# Patient Record
Sex: FEMALE | Race: WHITE
[De-identification: names, ages, dates, MRNs, and addresses within clinical notes are randomized per-mention and may not be internally consistent; named-entity substitution may affect disease eponyms.]

---

## 2021-04-13 ENCOUNTER — HOSPITAL ENCOUNTER (EMERGENCY)
Dept: HOSPITAL 47 - EC | Age: 21
Discharge: HOME | End: 2021-04-13
Payer: OTHER GOVERNMENT

## 2021-04-13 VITALS
TEMPERATURE: 97.7 F | HEART RATE: 73 BPM | SYSTOLIC BLOOD PRESSURE: 104 MMHG | DIASTOLIC BLOOD PRESSURE: 71 MMHG | RESPIRATION RATE: 20 BRPM

## 2021-04-13 DIAGNOSIS — Z90.89: ICD-10-CM

## 2021-04-13 DIAGNOSIS — F17.200: ICD-10-CM

## 2021-04-13 DIAGNOSIS — R11.2: Primary | ICD-10-CM

## 2021-04-13 DIAGNOSIS — J02.9: ICD-10-CM

## 2021-04-13 DIAGNOSIS — Z88.0: ICD-10-CM

## 2021-04-13 DIAGNOSIS — Z98.890: ICD-10-CM

## 2021-04-13 PROCEDURE — 96372 THER/PROPH/DIAG INJ SC/IM: CPT

## 2021-04-13 PROCEDURE — 99283 EMERGENCY DEPT VISIT LOW MDM: CPT

## 2021-04-13 NOTE — ED
General Adult HPI





- General


Chief complaint: Abdominal Pain


Stated complaint: Post op vomiting


Time Seen by Provider: 04/13/21 23:01


Source: patient


Mode of arrival: ambulatory


Limitations: no limitations





- History of Present Illness


Initial comments: 





Patient is a 20-year-old female presenting to the emergency Department with 

complaints of vomiting today as well as a sore throat.  Patient is s/p 

tonsillectomy 1 day, performed at [facility].  Patient states she took 

her pain medicine yesterday and then slept for most the day, she took it again 

today and then a few hours later had a vomiting episode.  She states she does 

not currently feel nauseous.  She denies any fevers or chills.  She states she 

is having a sore throat and is having soreness with swallowing.  She denies any 

abdominal pain, no diarrhea.  She has no further complaints at this time.  Upon 

arrival to the ER vitals are stable.





- Related Data


                                    Allergies











Allergy/AdvReac Type Severity Reaction Status Date / Time


 


Penicillins Allergy  Swelling Verified 04/13/21 22:47














Review of Systems


ROS Statement: 


Those systems with pertinent positive or pertinent negative responses have been 

documented in the HPI.





ROS Other: All systems not noted in ROS Statement are negative.





Past Medical History


Past Medical History: No Reported History


History of Any Multi-Drug Resistant Organisms: None Reported


Past Surgical History: Tonsillectomy


Past Psychological History: No Psychological Hx Reported


Smoking Status: Current every day smoker


Past Alcohol Use History: None Reported


Past Drug Use History: None Reported





General Exam





- General Exam Comments


Initial Comments: 





GENERAL: 


Patient is well-developed and well-nourished.  Patient is nontoxic and in no 

acute distress.





HEAD: 


Atraumatic, normocephalic.





EYES:


Pupils equal round and reactive to light, extraocular movements intact, sclera 

anicteric, conjunctiva are normal.  Eyelids were unremarkable.





ENT: 


TMs normal, nares patent, oropharynx with recent surgical intervention, erythema

present, no active drainage.  Moist mucous membranes.





NECK: 


Normal range of motion, supple without lymphadenopathy or JVD.





LUNGS:


Unlabored respirations.  Breath sounds clear to auscultation bilaterally and 

equal.  No wheezes rales or rhonchi.





HEART:


Regular rate and rhythm without murmurs, rubs or gallops.





ABDOMEN: 


Soft, nontender, normoactive bowel sounds.  No guarding, no rebound.  No masses 

appreciated.





: Deferred 





MUSCULOSKELETAL: 


Normal extremities with adequate strength and normal range of motion, no pitting

or edema.  No clubbing or cyanosis.





NEUROLOGICAL: 


Patient is alert and oriented x 3.   Normal speech, normal gait.   





PSYCH:


Normal mood, normal affect.





SKIN:


 Warm, Dry, normal turgor, no rashes or lesions noted.


Limitations: no limitations





Course


                                   Vital Signs











  04/13/21





  22:44


 


Temperature 97.7 F


 


Pulse Rate 73


 


Respiratory 20





Rate 


 


Blood Pressure 104/71


 


O2 Sat by Pulse 99





Oximetry 














Medical Decision Making





- Medical Decision Making





Patient is 20-year-old female here s/p tonsillectomy one day, performed at [facility].  She presents after a vomiting episode after taking her pain 

medication.  Her vitals are stable, her exam is consistent with a recent 

tonsillectomy, no other abnormalities.  Discussed with patient that she most 

likely had vomiting episodes secondary to her pain medicine.  I will give her a 

few tablets of Zofran to go home with, Toradol shot for her pain.  She is stable

for discharge.  She can follow-up with her surgeon.  She is in agreement with 

this plan of care, return parameters were discussed with the patient she 

verbalized understanding.  Case discussed with Dr. Gordon. 





Disposition


Clinical Impression: 


 Status post tonsillectomy, Nausea and vomiting





Disposition: HOME SELF-CARE


Condition: Stable


Instructions (If sedation given, give patient instructions):  Acute Nausea and 

Vomiting (ED)


Additional Instructions: 


Please return to the Emergency Department if symptoms worsen or any other 

concerns.


Continue to increase your fluids as discussed.


May take Zofran for any additional nausea.


Follow-up with your surgeon.


Is patient prescribed a controlled substance at d/c from ED?: No


Referrals: 


Nonstaff,Physician [Primary Care Provider] - 1-2 days

## 2021-09-12 ENCOUNTER — HOSPITAL ENCOUNTER (EMERGENCY)
Dept: HOSPITAL 47 - EC | Age: 21
Discharge: HOME | End: 2021-09-12
Payer: COMMERCIAL

## 2021-09-12 VITALS — DIASTOLIC BLOOD PRESSURE: 72 MMHG | HEART RATE: 71 BPM | RESPIRATION RATE: 18 BRPM | SYSTOLIC BLOOD PRESSURE: 113 MMHG

## 2021-09-12 VITALS — TEMPERATURE: 98.7 F

## 2021-09-12 DIAGNOSIS — F32.9: Primary | ICD-10-CM

## 2021-09-12 DIAGNOSIS — Z88.0: ICD-10-CM

## 2021-09-12 DIAGNOSIS — F17.200: ICD-10-CM

## 2021-09-12 LAB
ALBUMIN SERPL-MCNC: 4.3 G/DL (ref 3.5–5)
ALP SERPL-CCNC: 59 U/L (ref 38–126)
ALT SERPL-CCNC: 25 U/L (ref 4–34)
ANION GAP SERPL CALC-SCNC: 9 MMOL/L
APAP SPEC-MCNC: <10 UG/ML
AST SERPL-CCNC: 31 U/L (ref 14–36)
BASOPHILS # BLD AUTO: 0 K/UL (ref 0–0.2)
BASOPHILS NFR BLD AUTO: 0 %
BUN SERPL-SCNC: 13 MG/DL (ref 7–17)
CALCIUM SPEC-MCNC: 9.7 MG/DL (ref 8.4–10.2)
CHLORIDE SERPL-SCNC: 109 MMOL/L (ref 98–107)
CK SERPL-CCNC: 72 U/L (ref 30–135)
CO2 SERPL-SCNC: 20 MMOL/L (ref 22–30)
EOSINOPHIL # BLD AUTO: 0.2 K/UL (ref 0–0.7)
EOSINOPHIL NFR BLD AUTO: 4 %
ERYTHROCYTE [DISTWIDTH] IN BLOOD BY AUTOMATED COUNT: 4.88 M/UL (ref 3.8–5.4)
ERYTHROCYTE [DISTWIDTH] IN BLOOD: 12.7 % (ref 11.5–15.5)
GLUCOSE SERPL-MCNC: 79 MG/DL (ref 74–99)
HCG SERPL-MCNC: <2.4 MIU/ML
HCT VFR BLD AUTO: 42.5 % (ref 34–46)
HGB BLD-MCNC: 14.3 GM/DL (ref 11.4–16)
INR PPP: 1.1 (ref ?–1.2)
LYMPHOCYTES # SPEC AUTO: 1.9 K/UL (ref 1–4.8)
LYMPHOCYTES NFR SPEC AUTO: 36 %
MCH RBC QN AUTO: 29.4 PG (ref 25–35)
MCHC RBC AUTO-ENTMCNC: 33.7 G/DL (ref 31–37)
MCV RBC AUTO: 87.1 FL (ref 80–100)
MONOCYTES # BLD AUTO: 0.4 K/UL (ref 0–1)
MONOCYTES NFR BLD AUTO: 8 %
NEUTROPHILS # BLD AUTO: 2.6 K/UL (ref 1.3–7.7)
NEUTROPHILS NFR BLD AUTO: 49 %
PH UR: 6.5 [PH] (ref 5–8)
PLATELET # BLD AUTO: 266 K/UL (ref 150–450)
POTASSIUM SERPL-SCNC: 4.4 MMOL/L (ref 3.5–5.1)
PROT SERPL-MCNC: 7 G/DL (ref 6.3–8.2)
PT BLD: 11.7 SEC (ref 9–12)
RBC UR QL: 1 /HPF (ref 0–5)
SALICYLATES SERPL-MCNC: <1 MG/DL
SODIUM SERPL-SCNC: 138 MMOL/L (ref 137–145)
SP GR UR: 1.02 (ref 1–1.03)
SQUAMOUS UR QL AUTO: 1 /HPF (ref 0–4)
UROBILINOGEN UR QL STRIP: <2 MG/DL (ref ?–2)
WBC # BLD AUTO: 5.3 K/UL (ref 4–11)
WBC # UR AUTO: 2 /HPF (ref 0–5)

## 2021-09-12 PROCEDURE — 99285 EMERGENCY DEPT VISIT HI MDM: CPT

## 2021-09-12 PROCEDURE — 70450 CT HEAD/BRAIN W/O DYE: CPT

## 2021-09-12 PROCEDURE — 80320 DRUG SCREEN QUANTALCOHOLS: CPT

## 2021-09-12 PROCEDURE — 80053 COMPREHEN METABOLIC PANEL: CPT

## 2021-09-12 PROCEDURE — 71045 X-RAY EXAM CHEST 1 VIEW: CPT

## 2021-09-12 PROCEDURE — 85610 PROTHROMBIN TIME: CPT

## 2021-09-12 PROCEDURE — 82550 ASSAY OF CK (CPK): CPT

## 2021-09-12 PROCEDURE — 93005 ELECTROCARDIOGRAM TRACING: CPT

## 2021-09-12 PROCEDURE — 81001 URINALYSIS AUTO W/SCOPE: CPT

## 2021-09-12 PROCEDURE — 36415 COLL VENOUS BLD VENIPUNCTURE: CPT

## 2021-09-12 PROCEDURE — 80179 DRUG ASSAY SALICYLATE: CPT

## 2021-09-12 PROCEDURE — 80306 DRUG TEST PRSMV INSTRMNT: CPT

## 2021-09-12 PROCEDURE — 80143 DRUG ASSAY ACETAMINOPHEN: CPT

## 2021-09-12 PROCEDURE — 85025 COMPLETE CBC W/AUTO DIFF WBC: CPT

## 2021-09-12 PROCEDURE — 84702 CHORIONIC GONADOTROPIN TEST: CPT

## 2021-09-12 PROCEDURE — 87635 SARS-COV-2 COVID-19 AMP PRB: CPT

## 2021-09-12 NOTE — ED
General Adult HPI





- General


Source: EMS (And patient's roommate)


Mode of arrival: EMS


Limitations: no limitations





<Tong Lynn - Last Filed: 09/12/21 14:38>





<Albert Munoz - Last Filed: 09/12/21 16:07>





- General


Chief complaint: Altered Mental Status


Stated complaint: Altered Mental Status


Time Seen by Provider: 09/12/21 12:17





- History of Present Illness


Initial comments: 





Patient is a pleasant 21-year-old female presenting to the emergency department 

with change in mental status.  Patient's roommate was unable to wake her up 

today.  They did find a bottle of Lexapro with approximately 20 missing.  

Patient is not verbal at this time and provides no additional history.  Roommate

states patient has been depressed recently.  No reported trauma. (Tong Lynn)





- Related Data


                                Home Medications











 Medication  Instructions  Recorded  Confirmed


 


Escitalopram [Lexapro] 10 mg PO DAILY 09/12/21 09/12/21


 


Levonor 0.15mg-Ee 20-25-30 Mcg 1 tab PO DAILY 09/12/21 09/12/21











                                    Allergies











Allergy/AdvReac Type Severity Reaction Status Date / Time


 


Penicillins Allergy  Swelling Verified 04/13/21 22:47














Review of Systems


ROS Other: All systems not noted in ROS Statement are negative.


Limitations: ROS unobtainable due to patients medical condition





<Tong Lynn - Last Filed: 09/12/21 14:38>


ROS Other: All systems not noted in ROS Statement are negative.





<Albert Munoz - Last Filed: 09/12/21 16:07>


ROS Statement: 


Those systems with pertinent positive or pertinent negative responses have been 

documented in the HPI.








Past Medical History


Past Medical History: No Reported History


History of Any Multi-Drug Resistant Organisms: None Reported


Past Surgical History: Tonsillectomy


Past Psychological History: No Psychological Hx Reported


Smoking Status: Current every day smoker


Past Alcohol Use History: None Reported


Past Drug Use History: None Reported





<Tong Lynn - Last Filed: 09/12/21 14:38>





General Exam


Limitations: no limitations


General appearance: alert


Head exam: Present: atraumatic, normocephalic


Eye exam: Present: normal appearance, PERRL


Neck exam: Present: normal inspection.  Absent: tenderness


Respiratory exam: Present: normal lung sounds bilaterally


Cardiovascular Exam: Present: regular rate, normal rhythm


GI/Abdominal exam: Present: soft.  Absent: tenderness


Extremities exam: Present: normal inspection


Neurological exam: Present: alert, other (Nonverbal.  Not following commands.). 

Absent: motor sensory deficit (Limited exam.  No flaccid weakness.)


Psychiatric exam: Present: flat affect


Skin exam: Present: normal color





<Tong Lynn - Last Filed: 09/12/21 14:38>





Course





<Albert Munoz - Last Filed: 09/12/21 16:07>





                                   Vital Signs











  09/12/21





  12:12


 


Temperature 98.7 F


 


Pulse Rate 79


 


Respiratory 18





Rate 


 


Blood Pressure 127/77


 


O2 Sat by Pulse 100





Oximetry 














- Reevaluation(s)


Reevaluation #1: 





09/12/21 16:05


Patient was endorsed to me by ED physician Dr. Lynn (secondary to shift change)

with the EPS nurse's evaluation still pending.  Dr. Lynn has medically cleared 

the patient.  EPS nurse states that they feel that the patient can be safely 

discharged home at this time with action plan and outpatient psychiatric 

referral.  Will discharge patient home at this time. (Albert Munoz)





EKG Findings





- EKG Comments:


EKG Findings:: No sinus rhythm with a rate of 71.  .  QRS 80.  QT 32.  QTC

4:15.  Normal axis.  Normal QRS.  No acute ST change.





<Tong Lynn - Last Filed: 09/12/21 14:38>





Medical Decision Making





- Lab Data


Result diagrams: 


                                 09/12/21 12:38





                                 09/12/21 12:38





- Radiology Data


Radiology results: report reviewed (Computed tomography scan of the brain 

reveals no acute process), image reviewed (Chest x-ray shows no acute process)





<Tong Lynn - Last Filed: 09/12/21 14:38>





- Lab Data


Result diagrams: 


                                 09/12/21 12:38





                                 09/12/21 12:38





<Albert Munoz - Last Filed: 09/12/21 16:07>





- Medical Decision Making





Patient reevaluated and is alert and admits to feeling depressed.  She states 

earlier she did not want to wake up.  Patient denies any ingestion.  Patient 

cleared for mental health evaluation. (Tong Lynn)





- Lab Data





                                   Lab Results











  09/12/21 09/12/21 09/12/21 Range/Units





  12:38 12:38 12:38 


 


WBC  5.3    (4.0-11.0)  k/uL


 


RBC  4.88    (3.80-5.40)  m/uL


 


Hgb  14.3    (11.4-16.0)  gm/dL


 


Hct  42.5    (34.0-46.0)  %


 


MCV  87.1    (80.0-100.0)  fL


 


MCH  29.4    (25.0-35.0)  pg


 


MCHC  33.7    (31.0-37.0)  g/dL


 


RDW  12.7    (11.5-15.5)  %


 


Plt Count  266    (150-450)  k/uL


 


MPV  8.0    


 


Neutrophils %  49    %


 


Lymphocytes %  36    %


 


Monocytes %  8    %


 


Eosinophils %  4    %


 


Basophils %  0    %


 


Neutrophils #  2.6    (1.3-7.7)  k/uL


 


Lymphocytes #  1.9    (1.0-4.8)  k/uL


 


Monocytes #  0.4    (0-1.0)  k/uL


 


Eosinophils #  0.2    (0-0.7)  k/uL


 


Basophils #  0.0    (0-0.2)  k/uL


 


PT   11.7   (9.0-12.0)  sec


 


INR   1.1   (<1.2)  


 


Sodium     (137-145)  mmol/L


 


Potassium     (3.5-5.1)  mmol/L


 


Chloride     ()  mmol/L


 


Carbon Dioxide     (22-30)  mmol/L


 


Anion Gap     mmol/L


 


BUN     (7-17)  mg/dL


 


Creatinine     (0.52-1.04)  mg/dL


 


Est GFR (CKD-EPI)AfAm     (>60 ml/min/1.73 sqM)  


 


Est GFR (CKD-EPI)NonAf     (>60 ml/min/1.73 sqM)  


 


Glucose     (74-99)  mg/dL


 


Calcium     (8.4-10.2)  mg/dL


 


Total Bilirubin     (0.2-1.3)  mg/dL


 


AST     (14-36)  U/L


 


ALT     (4-34)  U/L


 


Alkaline Phosphatase     ()  U/L


 


Creatine Kinase     ()  U/L


 


Total Protein     (6.3-8.2)  g/dL


 


Albumin     (3.5-5.0)  g/dL


 


HCG, Quant     mIU/mL


 


Urine Color    Yellow  


 


Urine Appearance    Clear  (Clear)  


 


Urine pH    6.5  (5.0-8.0)  


 


Ur Specific Gravity    1.019  (1.001-1.035)  


 


Urine Protein    Negative  (Negative)  


 


Urine Glucose (UA)    Negative  (Negative)  


 


Urine Ketones    Negative  (Negative)  


 


Urine Blood    Negative  (Negative)  


 


Urine Nitrite    Negative  (Negative)  


 


Urine Bilirubin    Negative  (Negative)  


 


Urine Urobilinogen    <2.0  (<2.0)  mg/dL


 


Ur Leukocyte Esterase    Small H  (Negative)  


 


Urine RBC    1  (0-5)  /hpf


 


Urine WBC    2  (0-5)  /hpf


 


Ur Squamous Epith Cells    1  (0-4)  /hpf


 


Urine Mucus    Rare H  (None)  /hpf


 


Salicylates     mg/dL


 


Urine Opiates Screen    Not Detected  (NotDetected)  


 


Ur Oxycodone Screen    Not Detected  (NotDetected)  


 


Urine Methadone Screen    Not Detected  (NotDetected)  


 


Ur Propoxyphene Screen    Not Detected  (NotDetected)  


 


Acetaminophen     ug/mL


 


Ur Barbiturates Screen    Not Detected  (NotDetected)  


 


U Tricyclic Antidepress    Not Detected  (NotDetected)  


 


Ur Phencyclidine Scrn    Not Detected  (NotDetected)  


 


Ur Amphetamines Screen    Not Detected  (NotDetected)  


 


U Methamphetamines Scrn    Not Detected  (NotDetected)  


 


U Benzodiazepines Scrn    Not Detected  (NotDetected)  


 


Urine Cocaine Screen    Not Detected  (NotDetected)  


 


U Marijuana (THC) Screen    Not Detected  (NotDetected)  


 


Serum Alcohol     mg/dL


 


Coronavirus (PCR)     (Not Detectd)  














  09/12/21 09/12/21 Range/Units





  12:38 12:53 


 


WBC    (4.0-11.0)  k/uL


 


RBC    (3.80-5.40)  m/uL


 


Hgb    (11.4-16.0)  gm/dL


 


Hct    (34.0-46.0)  %


 


MCV    (80.0-100.0)  fL


 


MCH    (25.0-35.0)  pg


 


MCHC    (31.0-37.0)  g/dL


 


RDW    (11.5-15.5)  %


 


Plt Count    (150-450)  k/uL


 


MPV    


 


Neutrophils %    %


 


Lymphocytes %    %


 


Monocytes %    %


 


Eosinophils %    %


 


Basophils %    %


 


Neutrophils #    (1.3-7.7)  k/uL


 


Lymphocytes #    (1.0-4.8)  k/uL


 


Monocytes #    (0-1.0)  k/uL


 


Eosinophils #    (0-0.7)  k/uL


 


Basophils #    (0-0.2)  k/uL


 


PT    (9.0-12.0)  sec


 


INR    (<1.2)  


 


Sodium  138   (137-145)  mmol/L


 


Potassium  4.4   (3.5-5.1)  mmol/L


 


Chloride  109 H   ()  mmol/L


 


Carbon Dioxide  20 L   (22-30)  mmol/L


 


Anion Gap  9   mmol/L


 


BUN  13   (7-17)  mg/dL


 


Creatinine  0.67   (0.52-1.04)  mg/dL


 


Est GFR (CKD-EPI)AfAm  >90   (>60 ml/min/1.73 sqM)  


 


Est GFR (CKD-EPI)NonAf  >90   (>60 ml/min/1.73 sqM)  


 


Glucose  79   (74-99)  mg/dL


 


Calcium  9.7   (8.4-10.2)  mg/dL


 


Total Bilirubin  0.6   (0.2-1.3)  mg/dL


 


AST  31   (14-36)  U/L


 


ALT  25   (4-34)  U/L


 


Alkaline Phosphatase  59   ()  U/L


 


Creatine Kinase  72   ()  U/L


 


Total Protein  7.0   (6.3-8.2)  g/dL


 


Albumin  4.3   (3.5-5.0)  g/dL


 


HCG, Quant  <2.4   mIU/mL


 


Urine Color    


 


Urine Appearance    (Clear)  


 


Urine pH    (5.0-8.0)  


 


Ur Specific Gravity    (1.001-1.035)  


 


Urine Protein    (Negative)  


 


Urine Glucose (UA)    (Negative)  


 


Urine Ketones    (Negative)  


 


Urine Blood    (Negative)  


 


Urine Nitrite    (Negative)  


 


Urine Bilirubin    (Negative)  


 


Urine Urobilinogen    (<2.0)  mg/dL


 


Ur Leukocyte Esterase    (Negative)  


 


Urine RBC    (0-5)  /hpf


 


Urine WBC    (0-5)  /hpf


 


Ur Squamous Epith Cells    (0-4)  /hpf


 


Urine Mucus    (None)  /hpf


 


Salicylates  <1.0   mg/dL


 


Urine Opiates Screen    (NotDetected)  


 


Ur Oxycodone Screen    (NotDetected)  


 


Urine Methadone Screen    (NotDetected)  


 


Ur Propoxyphene Screen    (NotDetected)  


 


Acetaminophen  <10.0   ug/mL


 


Ur Barbiturates Screen    (NotDetected)  


 


U Tricyclic Antidepress    (NotDetected)  


 


Ur Phencyclidine Scrn    (NotDetected)  


 


Ur Amphetamines Screen    (NotDetected)  


 


U Methamphetamines Scrn    (NotDetected)  


 


U Benzodiazepines Scrn    (NotDetected)  


 


Urine Cocaine Screen    (NotDetected)  


 


U Marijuana (THC) Screen    (NotDetected)  


 


Serum Alcohol  <10   mg/dL


 


Coronavirus (PCR)   Not Detected  (Not Detectd)  














Disposition


Is patient prescribed a controlled substance at d/c from ED?: No





<Tong Lynn - Last Filed: 09/12/21 14:38>


Is patient prescribed a controlled substance at d/c from ED?: No


Time of Disposition: 16:07





<Albert Munoz - Last Filed: 09/12/21 16:07>


Clinical Impression: 


 Depression





Disposition: HOME SELF-CARE


Condition: Stable


Instructions (If sedation given, give patient instructions):  Depression (DC)


Additional Instructions: 


Return to the ER immediately should you develop thoughts of hurting herself or 

others, any significant pain, shortness of breath, feeling dizzy or faint, or 

new or worsening symptoms.  Follow up closely with your primary care provider, 

as well as psychiatry.


Referrals: 


Abram Mcnair MD [STAFF PHYSICIAN] - 1-2 days

## 2021-09-12 NOTE — CT
EXAMINATION TYPE: CT brain wo con

 

DATE OF EXAM: 9/12/2021

 

COMPARISON: None available

 

HISTORY: AMS

 

CT DLP: 1094.4 mGycm.  Automated Exposure Control for Dose Reduction was Utilized.

 

TECHNIQUE: Multiple contiguous axial CT images of the head were performed from the skull base through
 the vertex without the administration of intravenous contrast. 2-D sagittal and coronal reformats we
re obtained.

 

FINDINGS:   No acute intracranial hemorrhage, mass effect, or midline shift. The ventricles and sulci
 are within normal limits in size. Gray-white differentiation is preserved. No CT evidence of acute l
arge vessel territorial ischemia. Calvarium appears intact. The globes are intact and the visualized 
sinuses are clear. Mildly prominent adenoid tissue.

 

IMPRESSION:  Negative contrast enhanced head CT exam.

## 2021-09-12 NOTE — XR
EXAMINATION TYPE: XR chest 1V portable

 

DATE OF EXAM: 9/12/2021

 

COMPARISON: NONE

 

HISTORY: Acute mental status change.

 

TECHNIQUE: Single frontal view of the chest is obtained.

 

FINDINGS:  There is no focal air space opacity, pleural effusion, or pneumothorax seen.  The cardiac 
silhouette size is within normal limits.   The osseous structures are intact.

 

IMPRESSION:  No acute process.

## 2021-11-15 ENCOUNTER — HOSPITAL ENCOUNTER (OUTPATIENT)
Dept: HOSPITAL 47 - RADUSWWP | Age: 21
Discharge: HOME | End: 2021-11-15
Attending: OBSTETRICS & GYNECOLOGY
Payer: COMMERCIAL

## 2021-11-15 DIAGNOSIS — Z3A.01: ICD-10-CM

## 2021-11-15 DIAGNOSIS — Z36.87: Primary | ICD-10-CM

## 2021-11-15 PROCEDURE — 76801 OB US < 14 WKS SINGLE FETUS: CPT

## 2021-11-15 NOTE — US
Continued Stay Note  [facility]     Patient Name: Silvia Zabala  MRN: [id number]  Today's Date: 2/20/2017    Admit Date: 2/15/2017          Discharge Plan       02/20/17 0727    Case Management/Social Work Plan    Plan Return home    Patient/Family In Agreement With Plan yes    Final Note    Final Note Patient was DC'd home 2/19/17              Discharge Codes       02/20/17 0727    Discharge Codes    Discharge Codes 01  Discharge to home        Expected Discharge Date and Time     Expected Discharge Date Expected Discharge Time    Feb 19, 2017             Becky S. [place], RN     EXAMINATION TYPE: Ultrasound OB <= 14 week fetus

 

DATE OF EXAM: 11/15/2021 2:40 PM

 

COMPARISON: NONE

 

CLINICAL HISTORY: 21-year-old female Z36 Confirm dates.

 

EXAM PERFORMED:  Transabdominal (TA)

 

FINDINGS:

 

EXAM MEASUREMENTS:

 

GESTATIONAL AGE / DATING

 

Physician Established: Not yet established 

Dates by LMP: (7 weeks/6 days) 

** EDC:  6-28-21

Dates by First Scan:  No previous this is first scan 

Dates by Current Scan for: (7 weeks/4 days)  

** EDC: 6-30-21

 

MATERNAL ANATOMY 

 

Uterus: 9.2 x 4.7 x 6.6cm

Right Ovary: 2.1 x 1.9 x 1.6cm

Left Ovary: 2.9 x 1.9 x 1.6cm

Post CDS / Adnexa: wnl

Presence of free fluid: no

 

GESTATION / FETAL SURVEY

 

CRL: 1.4cm (7 weeks/4 days)

Yolk Sac (normal less than 6mm): 3mm

Heart Rate: 167 bpm

Rhythm:  Normal

IUP:  Viable IUP

 

Age Appropriate Anatomy

Cord Insertion: Too early to visualize

Fetal Limbs: Too early to visualize

Calvarium: Too early to visualize

 

Date of LMP: 9-21-21

Beta HcG (if available): Not available at this time

 

 

 

 

 

IMPRESSION:

 

1. Single live intrauterine pregnancy with estimated gestational age of 7 weeks 6 days by LMP. Curren
t ultrasound biometry is concordant (7 weeks 4 days) by CRL.

2. Complete fetal survey recommended at 18-20 weeks.

## 2021-11-17 ENCOUNTER — HOSPITAL ENCOUNTER (EMERGENCY)
Dept: HOSPITAL 47 - EC | Age: 21
Discharge: HOME | End: 2021-11-17
Payer: COMMERCIAL

## 2021-11-17 VITALS — HEART RATE: 66 BPM | SYSTOLIC BLOOD PRESSURE: 104 MMHG | DIASTOLIC BLOOD PRESSURE: 69 MMHG

## 2021-11-17 VITALS — TEMPERATURE: 98.2 F | RESPIRATION RATE: 18 BRPM

## 2021-11-17 DIAGNOSIS — F17.200: ICD-10-CM

## 2021-11-17 DIAGNOSIS — Z3A.08: ICD-10-CM

## 2021-11-17 DIAGNOSIS — Z90.89: ICD-10-CM

## 2021-11-17 DIAGNOSIS — O20.0: Primary | ICD-10-CM

## 2021-11-17 DIAGNOSIS — O99.331: ICD-10-CM

## 2021-11-17 DIAGNOSIS — Z88.0: ICD-10-CM

## 2021-11-17 LAB
ALBUMIN SERPL-MCNC: 3.8 G/DL (ref 3.5–5)
ALP SERPL-CCNC: 50 U/L (ref 38–126)
ALT SERPL-CCNC: 12 U/L (ref 4–34)
ANION GAP SERPL CALC-SCNC: 7 MMOL/L
AST SERPL-CCNC: 22 U/L (ref 14–36)
BASOPHILS # BLD AUTO: 0 K/UL (ref 0–0.2)
BASOPHILS NFR BLD AUTO: 0 %
BUN SERPL-SCNC: 7 MG/DL (ref 7–17)
CALCIUM SPEC-MCNC: 9.4 MG/DL (ref 8.4–10.2)
CHLORIDE SERPL-SCNC: 107 MMOL/L (ref 98–107)
CO2 SERPL-SCNC: 21 MMOL/L (ref 22–30)
EOSINOPHIL # BLD AUTO: 0.1 K/UL (ref 0–0.7)
EOSINOPHIL NFR BLD AUTO: 2 %
ERYTHROCYTE [DISTWIDTH] IN BLOOD BY AUTOMATED COUNT: 4.41 M/UL (ref 3.8–5.4)
ERYTHROCYTE [DISTWIDTH] IN BLOOD: 13.2 % (ref 11.5–15.5)
GLUCOSE SERPL-MCNC: 78 MG/DL (ref 74–99)
HCT VFR BLD AUTO: 37.6 % (ref 34–46)
HGB BLD-MCNC: 12.8 GM/DL (ref 11.4–16)
LYMPHOCYTES # SPEC AUTO: 1.4 K/UL (ref 1–4.8)
LYMPHOCYTES NFR SPEC AUTO: 18 %
MCH RBC QN AUTO: 29 PG (ref 25–35)
MCHC RBC AUTO-ENTMCNC: 34 G/DL (ref 31–37)
MCV RBC AUTO: 85.2 FL (ref 80–100)
MONOCYTES # BLD AUTO: 0.3 K/UL (ref 0–1)
MONOCYTES NFR BLD AUTO: 4 %
NEUTROPHILS # BLD AUTO: 5.9 K/UL (ref 1.3–7.7)
NEUTROPHILS NFR BLD AUTO: 75 %
PH UR: 7.5 [PH] (ref 5–8)
PLATELET # BLD AUTO: 252 K/UL (ref 150–450)
POTASSIUM SERPL-SCNC: 4 MMOL/L (ref 3.5–5.1)
PROT SERPL-MCNC: 6.5 G/DL (ref 6.3–8.2)
PROT UR QL: (no result)
RBC UR QL: 2 /HPF (ref 0–5)
SODIUM SERPL-SCNC: 135 MMOL/L (ref 137–145)
SP GR UR: 1.02 (ref 1–1.03)
SQUAMOUS UR QL AUTO: 4 /HPF (ref 0–4)
UROBILINOGEN UR QL STRIP: <2 MG/DL (ref ?–2)
WBC # BLD AUTO: 7.9 K/UL (ref 4–11)
WBC # UR AUTO: 6 /HPF (ref 0–5)

## 2021-11-17 PROCEDURE — 87591 N.GONORRHOEAE DNA AMP PROB: CPT

## 2021-11-17 PROCEDURE — 99284 EMERGENCY DEPT VISIT MOD MDM: CPT

## 2021-11-17 PROCEDURE — 80053 COMPREHEN METABOLIC PANEL: CPT

## 2021-11-17 PROCEDURE — 84702 CHORIONIC GONADOTROPIN TEST: CPT

## 2021-11-17 PROCEDURE — 85025 COMPLETE CBC W/AUTO DIFF WBC: CPT

## 2021-11-17 PROCEDURE — 87808 TRICHOMONAS ASSAY W/OPTIC: CPT

## 2021-11-17 PROCEDURE — 76801 OB US < 14 WKS SINGLE FETUS: CPT

## 2021-11-17 PROCEDURE — 87491 CHLMYD TRACH DNA AMP PROBE: CPT

## 2021-11-17 PROCEDURE — 36415 COLL VENOUS BLD VENIPUNCTURE: CPT

## 2021-11-17 PROCEDURE — 86900 BLOOD TYPING SEROLOGIC ABO: CPT

## 2021-11-17 PROCEDURE — 81001 URINALYSIS AUTO W/SCOPE: CPT

## 2021-11-17 PROCEDURE — 86901 BLOOD TYPING SEROLOGIC RH(D): CPT

## 2021-11-17 NOTE — US
EXAMINATION TYPE: Transabdominal

 

DATE OF EXAM: 2021 10:00 AM

 

COMPARISON: US 2 days ago

 

CLINICAL HISTORY: pain. Bleeding x 1 day,  1

 

EXAM PERFORMED:  Transabdominal (TA)

 

EXAM MEASUREMENTS:

 

GESTATIONAL AGE / DATING

 

Physician Established: ( 8   weeks/1 days) 

** EDC:  2022

Dates by LMP: (8 weeks/1 days)  

** EDC: 2022

Dates by First Scan: (  7  weeks/6 days)  

** EDC: 2022

Dates by Current Scan for: (8 weeks/0 days)  

** EDC: 2022

 

MATERNAL ANATOMY 

 

Uterus: 8.2 x 6.1 x 7.3cm

Right Ovary: 3.0 x 1.8 x 2.0cm

Left Ovary: 2.1 x 1.4 x 1.8cm

Post CDS / Adnexa: wnl

Presence of free fluid: wnl

Presence of corpus luteal cyst: not seen

Presence of subchorionic bleed: 1.2 x 0.7 x 0.7cm right of gestational sac

 

GESTATION / FETAL SURVEY

 

CRL: 1.6cm (8 weeks/0 days)

Yolk Sac (normal less than 6mm): 3.6mm

Heart Rate: 163 bpm

Rhythm:  Normal

IUP:  Viable IUP

 

Date of LMP: 2021

Beta HcG (if available):  Not available at time of exam

 

Viable single IUP measuring 8 week 0 days with a heart rate of 163bpm and an estimated delivery date 
of 2022, small possible subchorionic bleed measuring 1.2cm  

 

Persistent anteverted uterus. Persistent single live intrauterine gestation as gestational sac, yolk 
sac, and fetal pole redemonstrated. No free fluid. Tiny fluid collection adjacent to gestational sac 
measures near 1.0 cm, suspect tiny implantation bleed or subchorionic hemorrhage not clearly seen on 
prior.

 

Both ovaries redemonstrated. No suspicious adnexal masses.

 

IMPRESSION: As above.

## 2021-11-17 NOTE — ED
General Adult HPI





- General


Chief complaint: Vaginal Bleeding


Stated complaint: 8wks preg/Vaginal bleeding


Time Seen by Provider: 11/17/21 08:47


Source: patient, RN notes reviewed


Mode of arrival: ambulatory


Limitations: no limitations





- History of Present Illness


Initial comments: 





21-year-old female presents to the emergency room for a chief complaint of 

vaginal bleeding.  Patient is 8 weeks pregnant.  Patient states she woke up this

morning and went to work.  She noticed there was some blood in her underwear.  

States it looked like she started her period.  Patient denies any abdominal 

pain.  Patient states she did have a confirmed intrauterine pregnancy.  This is 

her first pregnancy.Patient has no other complaints at this time including 

shortness of breath, chest pain, abdominal pain, nausea or vomiting, headache, 

or visual changes.





- Related Data


                                Home Medications











 Medication  Instructions  Recorded  Confirmed


 


Escitalopram [Lexapro] 10 mg PO DAILY 09/12/21 09/12/21


 


Levonor 0.15mg-Ee 20-25-30 Mcg 1 tab PO DAILY 09/12/21 09/12/21











                                    Allergies











Allergy/AdvReac Type Severity Reaction Status Date / Time


 


Penicillins Allergy  Swelling Verified 11/17/21 08:38














Review of Systems


ROS Statement: 


Those systems with pertinent positive or pertinent negative responses have been 

documented in the HPI.





ROS Other: All systems not noted in ROS Statement are negative.





Past Medical History


Past Medical History: No Reported History


History of Any Multi-Drug Resistant Organisms: None Reported


Past Surgical History: Tonsillectomy


Past Psychological History: No Psychological Hx Reported


Smoking Status: Current every day smoker


Past Alcohol Use History: None Reported


Past Drug Use History: None Reported





General Exam


Limitations: no limitations


General appearance: alert, in no apparent distress


Head exam: Present: atraumatic


Eye exam: Present: normal appearance, PERRL, EOMI.  Absent: scleral icterus, 

conjunctival injection


ENT exam: Present: normal exam, mucous membranes moist


Neck exam: Present: normal inspection, full ROM.  Absent: tenderness


Respiratory exam: Present: normal lung sounds bilaterally.  Absent: respiratory 

distress, wheezes


Cardiovascular Exam: Present: regular rate, normal rhythm, normal heart sounds


GI/Abdominal exam: Present: soft, normal bowel sounds.  Absent: distended, 

tenderness


External exam: Present: normal external exam.  Absent: erythema, swelling, 

lesions, lacerations, ecchymosis


Speculum exam: Present: vaginal bleeding (Minimal bleeding noted).  Absent: 

normal speculum exam, erythema, vaginal discharge, cervical discharge, foreign 

body





Course


                                   Vital Signs











  11/17/21





  08:34


 


Temperature 98.2 F


 


Pulse Rate 84


 


Respiratory 18





Rate 


 


Blood Pressure 102/66


 


O2 Sat by Pulse 99





Oximetry 














Medical Decision Making





- Medical Decision Making





vital vitalss stable.  CBC CMP unremarkable.  Urinalysis shows her bacteria 

which will be treated given recommendations of treating asymptomatic bacteria 

and pregnancy.  Trichomonas is negative.  A+ blood type.  Fetal ultrasound does 

show a viable single IUP with a possible small subchorionic bleed versus 

implantation bleed measuring 1.2 cm. this time.  He can be discharged home to 

follow up with OB.  She will return here for any worsening symptoms.





- Lab Data


Result diagrams: 


                                 11/17/21 09:29





                                 11/17/21 09:29


                                   Lab Results











  11/17/21 11/17/21 11/17/21 Range/Units





  09:15 09:29 09:29 


 


WBC   7.9   (4.0-11.0)  k/uL


 


RBC   4.41   (3.80-5.40)  m/uL


 


Hgb   12.8   (11.4-16.0)  gm/dL


 


Hct   37.6   (34.0-46.0)  %


 


MCV   85.2   (80.0-100.0)  fL


 


MCH   29.0   (25.0-35.0)  pg


 


MCHC   34.0   (31.0-37.0)  g/dL


 


RDW   13.2   (11.5-15.5)  %


 


Plt Count   252   (150-450)  k/uL


 


MPV   8.1   


 


Neutrophils %   75   %


 


Lymphocytes %   18   %


 


Monocytes %   4   %


 


Eosinophils %   2   %


 


Basophils %   0   %


 


Neutrophils #   5.9   (1.3-7.7)  k/uL


 


Lymphocytes #   1.4   (1.0-4.8)  k/uL


 


Monocytes #   0.3   (0-1.0)  k/uL


 


Eosinophils #   0.1   (0-0.7)  k/uL


 


Basophils #   0.0   (0-0.2)  k/uL


 


Sodium     (137-145)  mmol/L


 


Potassium     (3.5-5.1)  mmol/L


 


Chloride     ()  mmol/L


 


Carbon Dioxide     (22-30)  mmol/L


 


Anion Gap     mmol/L


 


BUN     (7-17)  mg/dL


 


Creatinine     (0.52-1.04)  mg/dL


 


Est GFR (CKD-EPI)AfAm     (>60 ml/min/1.73 sqM)  


 


Est GFR (CKD-EPI)NonAf     (>60 ml/min/1.73 sqM)  


 


Glucose     (74-99)  mg/dL


 


Calcium     (8.4-10.2)  mg/dL


 


Total Bilirubin     (0.2-1.3)  mg/dL


 


AST     (14-36)  U/L


 


ALT     (4-34)  U/L


 


Alkaline Phosphatase     ()  U/L


 


Total Protein     (6.3-8.2)  g/dL


 


Albumin     (3.5-5.0)  g/dL


 


Urine Color    Yellow  


 


Urine Appearance    Cloudy H  (Clear)  


 


Urine pH    7.5  (5.0-8.0)  


 


Ur Specific Gravity    1.023  (1.001-1.035)  


 


Urine Protein    1+ H  (Negative)  


 


Urine Glucose (UA)    Negative  (Negative)  


 


Urine Ketones    Negative  (Negative)  


 


Urine Blood    Moderate H  (Negative)  


 


Urine Nitrite    Negative  (Negative)  


 


Urine Bilirubin    Negative  (Negative)  


 


Urine Urobilinogen    <2.0  (<2.0)  mg/dL


 


Ur Leukocyte Esterase    Trace H  (Negative)  


 


Urine RBC    2  (0-5)  /hpf


 


Urine WBC    6 H  (0-5)  /hpf


 


Ur Squamous Epith Cells    4  (0-4)  /hpf


 


Amorphous Sediment    Rare H  (None)  /hpf


 


Urine Bacteria    Rare H  (None)  /hpf


 


Urine Mucus    Occasional H  (None)  /hpf


 


Trichomonas Ag (Rapid)     (Negative)  


 


Blood Type  A Positive    


 


Blood Type Recheck  No Previous Record    


 


Bld Type Recheck Status  ABR ONLY    














  11/17/21 11/17/21 Range/Units





  09:29 09:29 


 


WBC    (4.0-11.0)  k/uL


 


RBC    (3.80-5.40)  m/uL


 


Hgb    (11.4-16.0)  gm/dL


 


Hct    (34.0-46.0)  %


 


MCV    (80.0-100.0)  fL


 


MCH    (25.0-35.0)  pg


 


MCHC    (31.0-37.0)  g/dL


 


RDW    (11.5-15.5)  %


 


Plt Count    (150-450)  k/uL


 


MPV    


 


Neutrophils %    %


 


Lymphocytes %    %


 


Monocytes %    %


 


Eosinophils %    %


 


Basophils %    %


 


Neutrophils #    (1.3-7.7)  k/uL


 


Lymphocytes #    (1.0-4.8)  k/uL


 


Monocytes #    (0-1.0)  k/uL


 


Eosinophils #    (0-0.7)  k/uL


 


Basophils #    (0-0.2)  k/uL


 


Sodium  135 L   (137-145)  mmol/L


 


Potassium  4.0   (3.5-5.1)  mmol/L


 


Chloride  107   ()  mmol/L


 


Carbon Dioxide  21 L   (22-30)  mmol/L


 


Anion Gap  7   mmol/L


 


BUN  7   (7-17)  mg/dL


 


Creatinine  0.49 L   (0.52-1.04)  mg/dL


 


Est GFR (CKD-EPI)AfAm  >90   (>60 ml/min/1.73 sqM)  


 


Est GFR (CKD-EPI)NonAf  >90   (>60 ml/min/1.73 sqM)  


 


Glucose  78   (74-99)  mg/dL


 


Calcium  9.4   (8.4-10.2)  mg/dL


 


Total Bilirubin  0.4   (0.2-1.3)  mg/dL


 


AST  22   (14-36)  U/L


 


ALT  12   (4-34)  U/L


 


Alkaline Phosphatase  50   ()  U/L


 


Total Protein  6.5   (6.3-8.2)  g/dL


 


Albumin  3.8   (3.5-5.0)  g/dL


 


Urine Color    


 


Urine Appearance    (Clear)  


 


Urine pH    (5.0-8.0)  


 


Ur Specific Gravity    (1.001-1.035)  


 


Urine Protein    (Negative)  


 


Urine Glucose (UA)    (Negative)  


 


Urine Ketones    (Negative)  


 


Urine Blood    (Negative)  


 


Urine Nitrite    (Negative)  


 


Urine Bilirubin    (Negative)  


 


Urine Urobilinogen    (<2.0)  mg/dL


 


Ur Leukocyte Esterase    (Negative)  


 


Urine RBC    (0-5)  /hpf


 


Urine WBC    (0-5)  /hpf


 


Ur Squamous Epith Cells    (0-4)  /hpf


 


Amorphous Sediment    (None)  /hpf


 


Urine Bacteria    (None)  /hpf


 


Urine Mucus    (None)  /hpf


 


Trichomonas Ag (Rapid)   Negative  (Negative)  


 


Blood Type    


 


Blood Type Recheck    


 


Bld Type Recheck Status    














Disposition


Clinical Impression: 


 Vaginal bleeding during pregnancy, Threatened miscarriage





Disposition: HOME SELF-CARE


Condition: Good


Instructions (If sedation given, give patient instructions):  Threatened 

Miscarriage (ED)


Additional Instructions: 


please follow-up with your OB by calling today for an appointment.  Return to 

the emergency room for any worsening symptoms.


Is patient prescribed a controlled substance at d/c from ED?: No


Referrals: 


Maycol Phillips MD [Primary Care Provider] - 1-2 days


Andres Oden MD [STAFF PHYSICIAN] - 1-2 days


Time of Disposition: 10:35

## 2022-02-09 ENCOUNTER — HOSPITAL ENCOUNTER (OUTPATIENT)
Dept: HOSPITAL 47 - RADUSWWP | Age: 22
Discharge: HOME | End: 2022-02-09
Attending: OBSTETRICS & GYNECOLOGY
Payer: COMMERCIAL

## 2022-02-09 DIAGNOSIS — O36.62X0: Primary | ICD-10-CM

## 2022-02-09 DIAGNOSIS — Z3A.20: ICD-10-CM

## 2022-02-09 PROCEDURE — 76811 OB US DETAILED SNGL FETUS: CPT

## 2022-02-09 NOTE — US
EXAMINATION TYPE: US OB fetal anatomy transabd

 

DATE OF EXAM: 2/9/2022

 

COMPARISON: US'S dated 11/15/2021 & 11/17/2021

 

HISTORY: O36.62X0 LARGE FOR DATES

 

TECHNIQUE:  Transabdominal (TA)

 

EXAM MEASUREMENTS:

 

GESTATIONAL AGE / DATING

Physician Established: (20 weeks/1 days)

** EDC:  06/28/2022

Dates by LMP:  (20 weeks/1 days)

** EDC: 06/28/2022

Dates by First Scan:  (19 weeks/6 days)

** EDC: 06/30/2022

Dates by Current Scan for:  (20 weeks/1 days)

** EDC: 06/28/2022

 

FETAL SURVEY

IUP:  Single

PLACENTA: Posterior     

PREVIA: No previa   

** PAOLA: 10.9 cm Normal

CERVICAL LENGTH (transabdominal: norm > 3.0cm): 3.8 cm

 

FETAL BIOMETRY

PRESENTATION:   Breech

BPD: 4.6 cm

**  19 weeks / 6 days

HC: 17.4 cm

**  19 weeks / 6 days

AC: 15.9 cm

**  21 weeks / 0 days

FL: 3.5 cm

**  20 weeks / 6 days

ESTIMATED FETAL WEIGHT IN GRAMS:  380 grams

ESTIMATED FETAL WEIGHT IN LBS/OZ:  0 lbs. 13 oz. 

WEIGHT PERCENTAGE BASED ON ESTABLISHED DATE:  82.3 %

** HC/AC:  1.09  Normal 

** FL/AC:  21.7 Normal

HEART RATE:  135 bpm 

RHYTHM: Normal

 

ANATOMY SEEN (within normal limits): 

* Lateral Vent (< 1 cm) 0.7 cm

* Cisterna Magna (< 1.1 cm) 0.5 cm

* Nuchal Fold (< 0.6 cm) 0.3 cm

* Cerebellum (varies with age) 2.2 cm

Choroid Plexus (bilateral)

Midline Falx 

Cavus Septi Pellucidi   

Four Chamber Heart

Outflow tracts:  LVOT/RVOT

Stomach

Situs

Nose / Lips 

Diaphragm 

 

Bladder

Cord Insert 

Three Vessel Cord 

Longitudinal Spine 

Transverse Spine 

Arms (bilateral)

Legs (bilateral)

 

ANATOMY SEEN (does not appear within normal limits):

Kidneys (bilateral) , only left kidney definitely identified. Baby is spine down breech and difficult
 to image the kidney area.

 

 

 

 

 

 

 

 

IMPRESSION:

Limited study given breech presentation. Poor visualization of the right kidney. Repeat imaging recom
mended.

## 2022-02-24 ENCOUNTER — HOSPITAL ENCOUNTER (OUTPATIENT)
Dept: HOSPITAL 47 - RADUSWWP | Age: 22
Discharge: HOME | End: 2022-02-24
Attending: OBSTETRICS & GYNECOLOGY
Payer: COMMERCIAL

## 2022-02-24 DIAGNOSIS — Z53.9: Primary | ICD-10-CM

## 2022-02-25 NOTE — US
EXAMINATION TYPE: US OB Call Back

 

DATE OF EXAM: 2/24/2022

 

COMPARISON: US

 

CLINICAL HISTORY: Z36.2 Follow up for fetal anotomy (kidney). Follow up/ call back.

 

GESTATIONAL AGE / DATING

Dates by Initial Survey Scan: (22 weeks/0 days)

** EDC: 06/30/2022

 

HEART RATE:   147 bpm 

RHYTHM: Normal

 

ANATOMY SEEN (second anatomic survey look): 

Kidneys (bilateral):   Bilateral kidneys imaged on today's scan.

 

 

 

 

 

 

 

 

IMPRESSION:

Bilateral kidneys appear unremarkable.

## 2022-03-16 ENCOUNTER — HOSPITAL ENCOUNTER (OUTPATIENT)
Dept: HOSPITAL 47 - FBPOP | Age: 22
End: 2022-03-16
Attending: OBSTETRICS & GYNECOLOGY
Payer: COMMERCIAL

## 2022-03-16 VITALS
TEMPERATURE: 98.5 F | DIASTOLIC BLOOD PRESSURE: 61 MMHG | HEART RATE: 111 BPM | SYSTOLIC BLOOD PRESSURE: 131 MMHG | RESPIRATION RATE: 16 BRPM

## 2022-03-16 DIAGNOSIS — R07.81: ICD-10-CM

## 2022-03-16 DIAGNOSIS — Z88.0: ICD-10-CM

## 2022-03-16 DIAGNOSIS — Z3A.25: ICD-10-CM

## 2022-03-16 DIAGNOSIS — O26.892: Primary | ICD-10-CM

## 2022-03-16 LAB
KETONES UR QL STRIP.AUTO: (no result)
PH UR: 7 [PH] (ref 5–8)
RBC UR QL: <1 /HPF (ref 0–5)
SP GR UR: 1.01 (ref 1–1.03)
SQUAMOUS UR QL AUTO: <1 /HPF (ref 0–4)
UROBILINOGEN UR QL STRIP: <2 MG/DL (ref ?–2)
WBC #/AREA URNS HPF: 7 /HPF (ref 0–5)

## 2022-03-16 PROCEDURE — 81001 URINALYSIS AUTO W/SCOPE: CPT

## 2022-03-16 PROCEDURE — 99213 OFFICE O/P EST LOW 20 MIN: CPT

## 2022-03-16 NOTE — P.MSEPDOC
Presenting Problems





- Arrival Data


Date of Arrival on Unit: 22


Time of Arrival on Unit: 03:26


Mode of Transport: Ambulatory





- Complaint


OB-Reason for Admission/Chief Complaint: Pain





Prenatal Medical History





- Pregnancy Information


: 1


Para: 0


Term: 0


: 0


Abortions: Spontaneous or Elective: 0


Number of Living Children: 0





- Gestational Age


Gestational Age by VIVIANE (wks/days): 25 Weeks and 1 Days





Review of Systems





- Review of Systems


Constitutional: No problems


Breast: No problems


ENT: No problems


Cardiovascular: No problems


Respiratory: No problems


Gastrointestinal: No problems


Genitourinary: No problems


Musculoskeletal: No problems


Neurological: No problems


Skin: No problems





Vital Signs





- Temperature


Temperature: 98.5 F


Temperature Source: Oral





- Pulse


  ** Right Brachial


Pulse Rate: 111


Pulse Assessment Method: Automatic Cuff





- Respirations


Respiratory Rate: 16


Oxygen Delivery Method: Room Air





- Blood Pressure


  ** Right Arm


Blood Pressure: 131/61


Blood Pressure Mean: 84


Blood Pressure Source: Automatic Cuff





Physician Notification





- Physician Notified


Physician Notified Date: 22


Physician Notified Time: 04:20


Physician: Marlyn Castrejon





- Notification Comment


Comment: Missy Lovell, RN spoke with Dr. Castrejon regaurding patients c/o right sided 

rib pain and negative u/a. Patient apprived for discharge to follow up with 

chiropractor, take tylenol, and use heating pad as needed.





Maternal Fetal Triage Index





- Stat/Priority 1


Stat Priority 1: No





- Urgent/Priority 2


Urgent Priority 2: No





- Prompt/Priority 3


Prompt Priority 3: No





- Non-Urgent/Priority 4


Non-Urgent Priority 4: Yes


Criteria Met for Priority 4: patient presents with right rib pain





- Scheduled/Requesting Priority 5


Scheduled/Requesting Priority 5: No





Disposition





- Disposition


OB Disposition: Discharge to home


Discharge Date: 22


Discharge Time: 04:30


I agree with the RN Medical Screening Exam: Yes


Case reviewed; plan agreed upon as documented in EMR[employer].: Yes


Diagnosis: OTH SYMPTOMS AND SIGNS INVOLVING THE MUSCULOSKELETAL SYSTEM